# Patient Record
Sex: MALE | Race: BLACK OR AFRICAN AMERICAN | Employment: FULL TIME | URBAN - METROPOLITAN AREA
[De-identification: names, ages, dates, MRNs, and addresses within clinical notes are randomized per-mention and may not be internally consistent; named-entity substitution may affect disease eponyms.]

---

## 2022-12-03 ENCOUNTER — HOSPITAL ENCOUNTER (EMERGENCY)
Age: 62
Discharge: HOME | End: 2022-12-03
Attending: EMERGENCY MEDICINE
Payer: MEDICAID

## 2022-12-03 VITALS
DIASTOLIC BLOOD PRESSURE: 97 MMHG | WEIGHT: 180.56 LBS | SYSTOLIC BLOOD PRESSURE: 147 MMHG | RESPIRATION RATE: 16 BRPM | HEART RATE: 72 BPM | OXYGEN SATURATION: 97 % | TEMPERATURE: 97.9 F | HEIGHT: 69 IN | BODY MASS INDEX: 26.74 KG/M2

## 2022-12-03 DIAGNOSIS — S61.011A LACERATION OF RIGHT THUMB WITHOUT FOREIGN BODY WITHOUT DAMAGE TO NAIL, INITIAL ENCOUNTER: Primary | ICD-10-CM

## 2022-12-03 PROCEDURE — 12001 RPR S/N/AX/GEN/TRNK 2.5CM/<: CPT

## 2022-12-03 PROCEDURE — 99282 EMERGENCY DEPT VISIT SF MDM: CPT

## 2022-12-03 NOTE — ED PROVIDER NOTES
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 male that presents to the emergency department with a laceration. He states that he cut his finger today while at work. Unknown last tetanus shot. He states that he continued to work but it continued to bleed so that is why came to the emergency department. He denies numbness or tingling to his finger. Yohana Stephen No past medical history on file. No past surgical history on file. No family history on file. Social History     Socioeconomic History    Marital status: Single     Spouse name: Not on file    Number of children: Not on file    Years of education: Not on file    Highest education level: Not on file   Occupational History    Not on file   Tobacco Use    Smoking status: Never    Smokeless tobacco: Not on file   Substance and Sexual Activity    Alcohol use: No    Drug use: No    Sexual activity: Not on file   Other Topics Concern    Not on file   Social History Narrative    Not on file     Social Determinants of Health     Financial Resource Strain: Not on file   Food Insecurity: Not on file   Transportation Needs: Not on file   Physical Activity: Not on file   Stress: Not on file   Social Connections: Not on file   Intimate Partner Violence: Not on file   Housing Stability: Not on file     No current facility-administered medications for this encounter. No current outpatient medications on file. No Known Allergies  Nursing Notes Reviewed    ROS:  At least 10 systems reviewed and otherwise negative except as in the [address]     Physical Exam:  ED Triage Vitals [12/03/22 1251]   Enc Vitals Group      BP (!) 147/97      Heart Rate 72      Resp 16      Temp 97.9 °F (36.6 °C)      Temp Source Oral      SpO2 97 %      Weight 180 lb 8.9 oz (81.9 kg)      Height 5' 9\" (1.753 m)      Head Circumference       Peak Flow       Pain Score       Pain Loc       Pain Edu? Excl. in [address]? My pulse oximetry interpretation is which is within the normal range    GENERAL APPEARANCE: Awake and alert. Cooperative. No acute distress. HEAD:  Atraumatic. EYES: EOM's grossly intact. ENT: Mucous membranes are moist.  No trismus. NECK:  Trachea midline. EXTREMITIES: No acute deformities. SKIN: Warm and dry. 1.5 cm laceration over the IP joint over the right thumb. Full extension and flexion noted. Normal sensation distally. Wound is not bleeding  NEUROLOGICAL: Moves all 4 extremities spontaneously. PSYCHIATRIC: Normal mood. I have reviewed and interpreted all of the currently available lab results from this visit (if applicable):  No results found for this visit on 12/03/22. EKG: (All EKG's are interpreted by myself in the absence of a cardiologist)      MDM:  Was cleaned and dried by myself. Patient declines a tetanus shot here. He has full extension and flexion and sensation of the finger. I did glue this and we placed a splint on the finger. Patient instructed to leave the splint in place for at least 5 days to aid in closure of this wound. Discharged home in good condition. Given strict return precautions    Clinical Impression:  1. Laceration of right thumb without foreign body without damage to nail, initial encounter        Disposition Vitals:  [unfilled], [unfilled], [unfilled], [unfilled]    Disposition referral (if applicable):  [place] [id number]  [phone]    If symptoms worsen      Disposition medications (if applicable): There are no discharge medications for this patient.         (Please note that portions of this note may have been completed with a voice recognition program. Efforts were made to edit the dictations but occasionally words are mis-transcribed.)    MD Severino Gonzalez MD  12/03/22 [id number]

## 2022-12-03 NOTE — ED TRIAGE NOTES
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 Pt was concerned because it was bleeding a lot. Pt is not up to date on his tetanus immunization and is not interested in receiving it today.